# Patient Record
Sex: FEMALE | Race: WHITE | Employment: FULL TIME | URBAN - NONMETROPOLITAN AREA
[De-identification: names, ages, dates, MRNs, and addresses within clinical notes are randomized per-mention and may not be internally consistent; named-entity substitution may affect disease eponyms.]

---

## 2018-03-14 ENCOUNTER — NURSE TRIAGE (OUTPATIENT)
Dept: ADMINISTRATIVE | Age: 23
End: 2018-03-14

## 2018-03-14 NOTE — TELEPHONE ENCOUNTER
Both feet are really swollen.  She gave birth on Saturday.  They are getting worse. [place]e Drum are painful.  Please advise. Reason for Disposition   [1] MODERATE leg swelling (e.g., more than just ankles, below knees) AND [2] lasts > 5 days postpartum    Answer Assessment - Initial Assessment Questions  1. ONSET: \"When did the swelling start? \" (e.g., minutes, hours, days)      yesterday  2. LOCATION: \"What part of the leg is swollen? \"  \"Are both legs swollen or just one leg? \"      Both feet are swollen  3. SEVERITY: \"How bad is the swelling? \" (e.g., localized; mild, moderate, severe)   - Localized - small area of swelling localized to one leg   - MILD pedal edema - swelling limited to foot and ankle, pitting edema < 1/4 inch deep (6 mm), rest and elevation eliminate most or all swelling   - MODERATE edema - swelling of lower leg to knee, pitting edema > 1/4 inch deep (6 mm), rest and elevation only partially reduce swelling   - SEVERE edema - swelling extends above knee, facial or hand swelling present       Not to twice  4. REDNESS: \"Does the swelling look red or infected? \"      No redness, no heat  5. PAIN: \"Is the swelling painful to touch? \" If so, ask: \"How painful is it? \"   (Scale 1-10; mild, moderate or severe)      Maybe 3  6. FEVER: \"Do you have a fever? \" If so, ask: \"What is it, how was it measured, and when did it start? \"       no  7. MEDICAL HISTORY: \"Do you have a history of blood clots, heart failure, kidney disease, liver failure, or cancer? \"      c/b--  8. OTHER SYMPTOMS: \"Do you have any other symptoms? \" (e.g., chest pain, difficulty breathing)      No shortness of breath, no pain  9. DELIVERY DATE: \"When was your delivery date? \" \"Vaginal delivery or ? \"      saturday    Protocols used: POSTPARTUM - LEG SWELLING AND EDEMA-ADULT-

## 2018-03-30 ENCOUNTER — NURSE TRIAGE (OUTPATIENT)
Dept: ADMINISTRATIVE | Age: 23
End: 2018-03-30

## 2018-03-31 NOTE — TELEPHONE ENCOUNTER
Message from Dimple Stagers sent at 3/30/2018  9:09 PM EDT     Summary: possible mastitis    Possible mastitis.  Her breast is sore and she is freezing and feels sick.  Please advise. \"I was breastfeeding but I wasn't producing much milk but I am pumping  Once a day. I was at the dr on Thurs but today I started with pain in the R breast and I am chilling and I feeling sick like I am going to vomit. \"

## 2021-12-07 ENCOUNTER — NURSE TRIAGE (OUTPATIENT)
Dept: OTHER | Facility: CLINIC | Age: 26
End: 2021-12-07

## 2021-12-08 NOTE — TELEPHONE ENCOUNTER
Reason for Disposition   [1] SEVERE headache (e.g., excruciating) AND [2] \"worst headache\" of life    Answer Assessment - Initial Assessment Questions  1. LOCATION: \"Where does it hurt? \"       Front and sides of the head     2. ONSET: \"When did the headache start? \" (Minutes, hours or days)       Started last Thursday \"I have been seeing a doctor for migraines\"- went to PCP yesterday and was diagnosed with shingles   -called PCP today to discuss fever and worsening head pain     3. PATTERN: \"Does the pain come and go, or has it been constant since it started? \"      Constant     4. SEVERITY: \"How bad is the pain? \" and \"What does it keep you from doing? \"  (e.g., Scale 1-10; mild, moderate, or severe)    - MILD (1-3): doesn't interfere with normal activities     - MODERATE (4-7): interferes with normal activities or awakens from sleep     - SEVERE (8-10): excruciating pain, unable to do any normal activities         7/10    5. RECURRENT SYMPTOM: \"Have you ever had headaches before? \" If so, ask: \"When was the last time? \" and \"What happened that time? \"      Yes- When I had COVID - \"tested negative for COVID \"    6. CAUSE: \"What do you think is causing the headache? \"      Unsure     7. MIGRAINE: \"Have you been diagnosed with migraine headaches? \" If so, ask: \"Is this headache similar? \"       Yes - \"a lot worse than a migraine- so much pressure up there\"     8. HEAD INJURY: \"Has there been any recent injury to the head? \"       Denies     9. OTHER SYMPTOMS: \"Do you have any other symptoms? \" (fever, stiff neck, eye pain, sore throat, cold symptoms)      Temp of 100.7, burning eyes-denies other symptoms     10. PREGNANCY: \"Is there any chance you are pregnant? \" \"When was your last menstrual period? \"        LMP - hysterectomy Dec 14, 2020    Protocols used: [facility]    Brief description of triage: severe head pain      Triage indicates for patient to Go to ED now or PCP triage. PCP office closed.  Pt will proceed to ED for evaluation. Care advice provided, patient verbalizes understanding; denies any other questions or concerns; instructed to call back for any new or worsening symptoms. This triage is a result of a call to Marta hamm Nurse. Please do not respond to the triage nurse through this encounter. Any subsequent communication should be directly with the patient.

## 2022-10-21 ENCOUNTER — NURSE TRIAGE (OUTPATIENT)
Dept: OTHER | Facility: CLINIC | Age: 27
End: 2022-10-21

## 2022-10-21 NOTE — TELEPHONE ENCOUNTER
Location of patient: [place]    Subjective: Caller states \"Rapid heart rate\"     Current Symptoms: Heart rate has been 110-120  140/90  Headache;  Surgery on hand and wrist 10/12/2022  Has pain from surgery and takes prescribed pain meds; Onset: 2 days ago; sudden    Associated Symptoms: NA    Pain Severity: 0/10; Temperature: denies     What has been tried: exercising to lose weight;    LMP: NA Pregnant: NA    Recommended disposition: See in Office Within 2 Weeks    Care advice provided, patient verbalizes understanding; denies any other questions or concerns; instructed to call back for any new or worsening symptoms. Patient/caller agrees to follow-up with PCP     This triage is a result of a call to Marta hamm Nurse. Please do not respond to the triage nurse through this encounter. Any subsequent communication should be directly with the patient.     Reason for Disposition   Problems with anxiety or stress    Protocols used: Heart Rate and Heartbeat Questions-ADULT-